# Patient Record
Sex: FEMALE | Race: WHITE | URBAN - METROPOLITAN AREA
[De-identification: names, ages, dates, MRNs, and addresses within clinical notes are randomized per-mention and may not be internally consistent; named-entity substitution may affect disease eponyms.]

---

## 2018-03-16 ENCOUNTER — OFFICE VISIT (OUTPATIENT)
Dept: PHYSICAL MEDICINE AND REHAB | Age: 23
End: 2018-03-16

## 2018-03-16 DIAGNOSIS — R29.898 WEAKNESS OF FOOT, UNSPECIFIED LATERALITY: ICD-10-CM

## 2018-03-16 DIAGNOSIS — R20.2 PARESTHESIA OF BOTH FEET: Primary | ICD-10-CM

## 2018-03-16 DIAGNOSIS — M79.672 FOOT PAIN, BILATERAL: ICD-10-CM

## 2018-03-16 DIAGNOSIS — M79.671 FOOT PAIN, BILATERAL: ICD-10-CM

## 2018-03-16 PROCEDURE — 95910 NRV CNDJ TEST 7-8 STUDIES: CPT | Performed by: PHYSICAL MEDICINE & REHABILITATION

## 2018-03-16 PROCEDURE — 95886 MUSC TEST DONE W/N TEST COMP: CPT | Performed by: PHYSICAL MEDICINE & REHABILITATION

## 2018-03-16 NOTE — PROGRESS NOTES
EMG REPORT     CHIEF COMPLAINT: Progressive lower back pain and LE numbness with tingling. HISTORY OF PRESENT ILLNESS: 20 y.o. R hand dominant female with more than 3 years of lower back pain and leg numbness with tingling, especially when she's standing \"for a while\". She notes particular discomfort around each ankle and radiating up into the knee areas. She also described the legs as \"being completely numb all over\". She rated the pain severity as 5-10/10. Significant sleep disturbance as well. Some cramping behind her knees. No reported limb discoloration or rashes, but the feet \"seem swollen a lot\". No h/o DM or thyroid disorder. PHYSICAL EXAMINATION: Alert. Normal lumbar lordosis with paraspinal and limb muscle tenderness to light palpation. Normal hip and knee PROM. Neg SLR. 2+/= LE DTR's. No atrophy, tremor or clonus. Give way with most attempts at MMT with c/o pain. Normal perception to light touch throughout. NERVE CONDUCTION STUDIES:     MOTOR         LATENCY NORMAL AMPLITUDE DISTANCE COND. JASMIN.    R  PERONEAL   4.0 < 6.2 msec 3.1 8 cm 57   L  PERONEAL  < 6.2 msec  8 cm    RIGHT  TIBIAL 4.0 < 6.2 msec 1.9 8 cm 62   LEFT TIBIAL  < 6.2 msec  8 cm    R TIB H REFLEX 25.8 < 50 msec      L TIB H REFLEX 26.7 < 50 msec         SENSORY  ANTIDROMIC        LATENCY NORMAL AMPLITUDE DISTANCE   R SUP PERONEAL 2.6 < 3.6 msec 7 10 cm   L SUP PERONEAL  < 3.6 msec  10 cm   RIGHT  SURAL 3.4 < 4.0 msec 22 14 cm   LEFT  SURAL 3.0 < 4.0 msec 14 14 cm         NEEDLE EMG:      RIGHT   LEFT     Insertional Activity Spontaneous  Activity Volutional  MUAP's Insertional Activity Spontaneous  Activity Volutional  MUAP's   Lumbar paraspinals Normal None Normal Normal None Normal   Glut Med Normal None Normal Normal None Normal   Rect fem Normal None Normal Normal None Normal   Vast Med Normal None Normal Normal None Normal   Ant Tibialis Normal None Normal Normal None Normal   EHL Normal None Normal Normal None Normal   FDL

## 2020-03-19 ENCOUNTER — TELEPHONE (OUTPATIENT)
Dept: CARDIOLOGY CLINIC | Age: 25
End: 2020-03-19

## 2020-03-31 ENCOUNTER — TELEPHONE (OUTPATIENT)
Dept: CARDIOLOGY CLINIC | Age: 25
End: 2020-03-31

## 2020-03-31 NOTE — TELEPHONE ENCOUNTER
Left message for patient requesting a return call to schedule a consult for acute heart failure per referral from [facility].

## 2020-04-07 ENCOUNTER — TELEPHONE (OUTPATIENT)
Dept: CARDIOLOGY CLINIC | Age: 25
End: 2020-04-07

## 2020-05-20 ENCOUNTER — INITIAL CONSULT (OUTPATIENT)
Dept: CARDIOLOGY CLINIC | Age: 25
End: 2020-05-20
Payer: COMMERCIAL

## 2020-05-20 VITALS
DIASTOLIC BLOOD PRESSURE: 78 MMHG | HEIGHT: 60 IN | BODY MASS INDEX: 44.33 KG/M2 | WEIGHT: 225.8 LBS | SYSTOLIC BLOOD PRESSURE: 104 MMHG | HEART RATE: 72 BPM

## 2020-05-20 PROCEDURE — G8417 CALC BMI ABV UP PARAM F/U: HCPCS | Performed by: INTERNAL MEDICINE

## 2020-05-20 PROCEDURE — 99203 OFFICE O/P NEW LOW 30 MIN: CPT | Performed by: INTERNAL MEDICINE

## 2020-05-20 PROCEDURE — 93000 ELECTROCARDIOGRAM COMPLETE: CPT | Performed by: INTERNAL MEDICINE

## 2020-05-20 PROCEDURE — G8428 CUR MEDS NOT DOCUMENT: HCPCS | Performed by: INTERNAL MEDICINE

## 2020-05-20 NOTE — PROGRESS NOTES
auscultation  Extremities:  No significant pitting edema, clubbing,  Cyanosis, petechiae. SKIN: Warm and well perfused, no pallor or cyanosis. Has multiple tattoos on her extremities. Abdomen:  No masses or tenderness. No organomegaly noted. Musculoskeletal:  No spinal deformities noted. Gait is normal.  Muscle strength is normal.  Neurologic:  Oriented to time, place, and person and non-anxious. No focal neurological deficit noted. Psychiatric: Normal mood and effect. LAB REVIEW:  CBC:   Lab Results   Component Value Date    WBC 11.5 01/06/2014    HGB 11.9 01/06/2014    HCT 35.6 01/06/2014     01/06/2014     Renal:   Lab Results   Component Value Date    BUN 5 01/06/2014    CREATININE 0.4 01/06/2014     01/06/2014    K 3.9 01/06/2014     EKG:  Normal sinus rhythm 72 bpm essentially normal ECG. ECHO:  3/11/2020 at [facility]  Normal left ventricle size. Normal global systolic LV function. The ejection fraction, measured in 2D mode, is 57 %. No regional wall motion abnormality noted. Normal diastolic left ventricular function. Aortic valve not well visualized. Bicuspid aortic valve cannot be ruled out. Consider KAUR for further interrogation of aortic valve if clinically warranted. Pericardium: No pericardial effusion. Chest x-ray on 9/18/2019 reported  FINDINGS:  The cardiomediastinal silhouette is normal. The lungs are clear with interval resolution of the congestive changes. . There is no effusions or pneumothorax. CT abdomen on 9/18/2019 reported  Impression:  1. Question subtle right ureteral urothelial enhancement, which can be seen with ascending urinary tract infection. Clinical and laboratory correlation is recommended. Otherwise, no acute intra-abdominal abnormality. 2. Trace bilateral pleural effusions with mild bibasilar atelectasis    Assessment / Recommendations:    Leg swelling  Counseled to lose weight and avoid prolonged standing and walking regularly.   If

## 2020-05-20 NOTE — ASSESSMENT & PLAN NOTE
Counseled extensively for smoking cessation and help was offered. She is referred to Hospital  smoking cessation program.  Primary prevention is the key. Follow up with primary care physician.

## 2020-05-20 NOTE — PATIENT INSTRUCTIONS
Counseled for smoking cessation. She is reassured that there is no evidence of any significant structural heart disease or congestive heart failure. Counseled for calorie counting, reduction in serving size and exercise and lifestyle modification for weight loss. Counseled to elevate feet when resting and when resting in bed at night with pillows underneath and use thigh high compression stockings in the morning and remove them at night. Patient verbalizes understanding. Questions answered. Multiple questions answered. Patient verbalizes understanding and asks relevant questions. Follow up with PCP and see me as needed.

## 2022-10-02 NOTE — PROGRESS NOTES
atient Name:  Corie Charles  Patient :  1995  Patient MRN:  [id number]     Primary Oncologist: Jesus Michelle MD  Referring Provider: Kwesi Finch PA-C     Date of Service: 10/14/2022      Reason for Consult:  + anticardiolipin ab. Chief Complaint:    Chief Complaint   Patient presents with    New Patient        Patient Active Problem List:     Leg swelling     Morbid obesity ([address]     Tobacco abuse     HPI:   Jaleesa Moy is a pleasant 28year old female patient who was referred for evaluation of + anti cardiolipin ab.  2012 acute hep serology wnl.  2022 TSH 1.85. WBC 11.2, Hg 12.8, plt 412. Alk phos 125H. HCV not detected. DRVVT 2021 negative. Anticardiolipin IgM Ab 15, indeterminate. 3/2018 anticardiolipin IgM 13.4, nomal less than 11. DRVVT negative. Phosphatidyl Serine IgG, IgM wnl. She has 10 pregnancies. The first pregnancy in  was a miscarriage. Second pregnancy in  she had a daughter with a positive. Son was born in  and he is also A+. In  she has miscarriage. In  she has with a negative. From  till 2022 she has miscarriages. The first miscarriage happened at 16 weeks of pregnancy. Second at 13 weeks of pregnancy. The rest of the miscarriage happened in the first 8 weeks of pregnancy. She has a negative. Reportedly she received RhoGAM.    She plans to go to fertility clinic to have more children. She was seen by OB in [place] and was tested positive with low level anticardiolipin IgM antibody. Denied any history of blood clot. No blood clot in the family either. She is agreeable to have labs for anticardiolipin antibody today.     Past Medical History:   Diagnosis Date    Anemia     Anxiety     Headache     Hepatitis C     Leg swelling     Morbid obesity ([address]     Tobacco abuse      Past Surgical History:   Procedure Laterality Date     SECTION      ,     CHOLECYSTECTOMY  2016    KNEE SURGERY      left , , 2015    SINUS SURGERY  2009     Social History:   She has  been smoking >10 years. She has cut back on smoking. Currently she smokes 3 cigarettes a day. She did 1 can a day. She drinks alcohol occasionally. Family History:   Maternal great grandmother had lung cancer and she was a smoker. Maternal grandmother had lupus. Allergies   Allergen Reactions    Latex Itching and Rash    Phenergan [Promethazine Hcl]      Makes her hyper     No current outpatient medications on file prior to visit. No current facility-administered medications on file prior to visit. Review of Systems:    Constitutional:  No weight loss, No fever, No chills, No night sweats. Energy level good. Eyes:  No diplopia, No transient or permanent loss of vision, No scotomata. ENT / Mouth:  No epistaxis, No dysphagia, No hoarseness, No oral ulcers, No gingival bleeding. No sore throat, No postnasal drip, No nasal drip, No mouth pain, No sinus pain, No tinnitus, Normal hearing. Cardiovascular:  No chest pain, No palpitations, No syncope, No upper extremity edema, No lower extremity edema, No calf discomfort. Respiratory:  No cough. No hemoptysis, No pleurisy, No wheezing, No dyspnea. Breast:  No breast mass, No pain, No nipple discharge, No change in size, No change in shape. Gastrointestinal:  No abdominal pain, No abdominal cramping, No nausea, No vomiting, No constipation, No diarrhea, No hematochezia, No melena, No jaundice, No dyspepsia, No dysphagia. Urinary:  No dysuria, No hematuria, No urinary incontinence. Gynecological:  No vaginal discharge, No suprapubic pain, No abnormal vaginal bleeding. (Female Patients Only)  Musculoskeletal:  No muscle pain, No swollen joints, No joint redness, No bone pain, No spine tenderness. Skin:  No rash, No nodules, No pruritus, No lesions.   Neurologic:  No confusion, No seizures, No syncope, No tremor, No speech change, No headache, No hiccups, No abnormal gait, No sensory changes, No weakness. Psychiatric:  No depression, No anxiety, Concentration normal.  Endocrine:  No polyuria, No polydipsia, No hot flashes, No thyroid symptoms. Hematologic:  No epistaxis, No gingival bleeding, No petechiae, No ecchymosis. Lymphatic:  No lymphadenopathy, No lymphedema. Allergy / Immunologic:  No eczema, No frequent mucous infections, No frequent respiratory infections, No recurrent urticarial, No frequent skin infections. Vital Signs: BP (!) 111/59 (Site: Right Upper Arm, Position: Sitting, Cuff Size: Large Adult)   Pulse 80   Temp 98.1 °F (36.7 °C) (Temporal)   Ht 5' (1.524 m)   Wt 249 lb 12.8 oz (113.3 kg)   SpO2 98%   BMI 48.79 kg/m²      Physical Exam:  CONSTITUTIONAL: awake, alert, cooperative, no apparent distress   EYES: pupils equal, round and reactive to light. Sclera clear and conjunctiva normal  ENT: Normocephalic, without obvious abnormality, atraumatic  NECK: supple, symmetrical, no jugular venous distension and no carotid bruits   HEMATOLOGIC/LYMPHATIC: no cervical, supraclavicular or axillary lymphadenopathy   LUNGS: no increased work of breathing and clear to auscultation   CARDIOVASCULAR: regular rate and rhythm, normal S1 and S2, no murmur noted  ABDOMEN: normal bowel sound, soft, non-distended, non-tender, no masses palpated, no hepatosplenomegaly   MUSCULOSKELETAL: full range of motion noted, tone is normal  NEUROLOGIC: awake, alert, oriented to name, place and time. Motor skills grossly intact. SKIN: Normal skin color, texture, turgor and no jaundice. appears intact   EXTREMITIES: no LE edema. Homans negative.      Labs:  Hematology:  Lab Results   Component Value Date    WBC 11.5 (H) 01/06/2014    RBC 4.09 (L) 01/06/2014    HGB 11.9 (L) 01/06/2014    HCT 35.6 (L) 01/06/2014    MCV 87.0 01/06/2014    MCH 29.0 01/06/2014    MCHC 33.3 01/06/2014    RDW 15.3 (H) 01/06/2014     01/06/2014    MPV 7.5 01/06/2014    SEGSPCT 76.0 (H) 01/06/2014    EOSRELPCT 0.6 01/06/2014    BASOPCT 0.1 01/06/2014    LYMPHOPCT 17.0 (L) 01/06/2014    MONOPCT 6.3 01/06/2014    SEGSABS 8.7 (H) 01/06/2014    EOSABS 0.1 01/06/2014    BASOSABS 0.0 01/06/2014    LYMPHSABS 2.0 01/06/2014    MONOSABS 0.7 01/06/2014    DIFFTYPE AUTOMATED DIFFERENTIAL 01/06/2014     No results found for: ESR    Chemistry:  Lab Results   Component Value Date     01/06/2014    K 3.9 01/06/2014     01/06/2014    CO2 27 01/06/2014    BUN 5 (L) 01/06/2014    CREATININE 0.4 (L) 01/06/2014    CALCIUM 9.6 01/06/2014    PROT 6.9 01/06/2014    LABALBU 3.9 01/06/2014    BILITOT 0.2 (L) 01/06/2014    ALKPHOS 84 01/06/2014    AST 15 01/06/2014    ALT 18 01/06/2014    LABGLOM >60 01/06/2014    GFRAA >60 01/06/2014     Lab Results   Component Value Date    TSHHS 1.711 07/21/2011     Immunology:  Lab Results   Component Value Date    PROT 6.9 01/06/2014        Observations:  PHQ-9 Total Score: 1 (10/14/2022  1:59 PM)     Assessment & Plan:  1. She has low positive anticardiolipin IgM antibody. Told by the OB before that the level was low to cause any blood clot. I will recheck today. She denied any history of blood clot or family history of blood clot. She has history of multiple miscarriages. She is A- and likely spouse is A+. She has first 2 children with A positive and the last child was A negative. Reportedly she received RhoGAM.  From the laboratory criteria she is not fit  for antiphospholipid syndrome. 2. We discussed about smoking cessation. 3. She will follow with fertility clinic. RTC in 3 - 4 weeks to discuss result. All of her questions have been answered for today. I have discussed the above stated plan with the patient and they verbalized understanding and agreed with the plan. Thank you for allowing us to participate in this patient's care.

## 2022-10-14 ENCOUNTER — HOSPITAL ENCOUNTER (OUTPATIENT)
Dept: INFUSION THERAPY | Age: 27
Discharge: HOME | End: 2022-10-14
Payer: COMMERCIAL

## 2022-10-14 ENCOUNTER — INITIAL CONSULT (OUTPATIENT)
Dept: ONCOLOGY | Age: 27
End: 2022-10-14
Payer: COMMERCIAL

## 2022-10-14 VITALS
OXYGEN SATURATION: 98 % | HEIGHT: 60 IN | SYSTOLIC BLOOD PRESSURE: 111 MMHG | WEIGHT: 249.8 LBS | DIASTOLIC BLOOD PRESSURE: 59 MMHG | BODY MASS INDEX: 49.04 KG/M2 | HEART RATE: 80 BPM | TEMPERATURE: 98.1 F

## 2022-10-14 DIAGNOSIS — R76.0 ANTI-CARDIOLIPIN ANTIBODY POSITIVE: ICD-10-CM

## 2022-10-14 DIAGNOSIS — R76.0 ANTI-CARDIOLIPIN ANTIBODY POSITIVE: Primary | ICD-10-CM

## 2022-10-14 PROCEDURE — 99211 OFF/OP EST MAY X REQ PHY/QHP: CPT

## 2022-10-14 PROCEDURE — 86146 BETA-2 GLYCOPROTEIN ANTIBODY: CPT

## 2022-10-14 PROCEDURE — 4004F PT TOBACCO SCREEN RCVD TLK: CPT | Performed by: INTERNAL MEDICINE

## 2022-10-14 PROCEDURE — G8417 CALC BMI ABV UP PARAM F/U: HCPCS | Performed by: INTERNAL MEDICINE

## 2022-10-14 PROCEDURE — 36415 COLL VENOUS BLD VENIPUNCTURE: CPT

## 2022-10-14 PROCEDURE — G8484 FLU IMMUNIZE NO ADMIN: HCPCS | Performed by: INTERNAL MEDICINE

## 2022-10-14 PROCEDURE — 85613 RUSSELL VIPER VENOM DILUTED: CPT

## 2022-10-14 PROCEDURE — 86147 CARDIOLIPIN ANTIBODY EA IG: CPT

## 2022-10-14 PROCEDURE — 99204 OFFICE O/P NEW MOD 45 MIN: CPT | Performed by: INTERNAL MEDICINE

## 2022-10-14 PROCEDURE — G8427 DOCREV CUR MEDS BY ELIG CLIN: HCPCS | Performed by: INTERNAL MEDICINE

## 2022-10-14 ASSESSMENT — PATIENT HEALTH QUESTIONNAIRE - PHQ9
SUM OF ALL RESPONSES TO PHQ QUESTIONS 1-9: 1
SUM OF ALL RESPONSES TO PHQ9 QUESTIONS 1 & 2: 1
2. FEELING DOWN, DEPRESSED OR HOPELESS: 1
SUM OF ALL RESPONSES TO PHQ QUESTIONS 1-9: 1
1. LITTLE INTEREST OR PLEASURE IN DOING THINGS: 0
SUM OF ALL RESPONSES TO PHQ QUESTIONS 1-9: 1
SUM OF ALL RESPONSES TO PHQ QUESTIONS 1-9: 1

## 2022-10-14 NOTE — PROGRESS NOTES
MA Rooming Questions  Patient: Divya Gonzalez  MRN: [id number]    Date: 10/14/2022        NEW PATIENT     5. Did the patient have a depression screening completed today?  Yes    PHQ-9 Total Score: 1 (10/14/2022  1:59 PM)       PHQ-9 Given to (if applicable):               PHQ-9 Score (if applicable):                     [] Positive     []  Negative              Does question #9 need addressed (if applicable)                     [] Yes    []  No               Sunita Medico, CMA

## 2022-10-17 LAB
ANTICARDIOLIPIN IGA ANTIBODY: <10 APL
ANTICARDIOLIPIN IGG ANTIBODY: <10 GPL
BETA 2 GLYCOPROT.1 IGA AB: <10 SAU
BETA 2 GLYCOPROT.1 IGM AB: <10 SMU
BETA-2 GLYCOPROTEIN 1 IGG ANTIBODY: <10 SGU
CARDIOLIPIN AB IGM: 12 MPL

## 2022-10-18 LAB
DILUTE RUSSELL VIPER VENOM TIME: 41 SEC (ref 33–44)
DRVVT 1 TO 1 MIX REFLEX ONLY: NORMAL SEC (ref 33–44)
DRVVT CONFIRMATION TEST: NORMAL RATIO